# Patient Record
Sex: MALE | Race: WHITE
[De-identification: names, ages, dates, MRNs, and addresses within clinical notes are randomized per-mention and may not be internally consistent; named-entity substitution may affect disease eponyms.]

---

## 2017-01-21 ENCOUNTER — HOSPITAL ENCOUNTER (EMERGENCY)
Dept: HOSPITAL 21 - SED | Age: 47
Discharge: LEFT BEFORE DISCHARGE | End: 2017-01-21
Payer: COMMERCIAL

## 2017-01-21 VITALS
DIASTOLIC BLOOD PRESSURE: 93 MMHG | OXYGEN SATURATION: 98 % | RESPIRATION RATE: 20 BRPM | HEART RATE: 103 BPM | SYSTOLIC BLOOD PRESSURE: 135 MMHG

## 2017-01-21 VITALS — HEIGHT: 77 IN | WEIGHT: 220.46 LBS | BODY MASS INDEX: 26.03 KG/M2

## 2017-01-21 DIAGNOSIS — F11.23: Primary | ICD-10-CM

## 2017-01-21 DIAGNOSIS — F17.200: ICD-10-CM

## 2017-01-21 NOTE — ED.REPORT
HPI-URI / Cough / Cold


Date of Service


Jan 21, 2017


 ED Provider: Sandra Cannon MD


This is a 46 year old male with a history of chronic back, neck pain, IV heroin 

abuse presenting to the ED complaining of productive cough that began 3 weeks 

ago. Describes green and yellow sputum, fever, chills, and malaise. Pt reports 

he is in opiate withdrawal and reports worsening chills and diffuse myalgias. 

Pt states respiratory symptoms are improving but opiate withdrawal is his main 

concern today. Pt expresses interest in suboxone, last used heroin yesterday, 

pt would like to stop. Denies abdominal pain, nausea, vomiting, or shortness of 

breath.


Nursing Notes


Stated Complaint:  COUGH, FEVER


Chief Complaint:  Respiratory Complaints


Nursing Notes Reviewed:  Yes


Allergies:  


Coded Allergies:  


     No Known Allergies (Unverified , 1/24/16)





General


Time Seen by MD:  19:22





Chief Complaint


Cough, non-productive


Hx Obtained From:  Patient


Arrived By:  Walk-in


Onset Occurred:  More than a week ago... (3 weeks)


Symptom Duration:  Since onset


Severity: Current:  Mild


Pertinent Negative:  Pt denies other symptoms


Recent Healthcare:  No recent doctor visit, No recent hospitalization


Similar Sx Previous:  No





Past Medical History


Past Medical History





chronic back and neck pain





Past Surgical History





right ankle surgery





Smoking History


Current Every Day Smoker





Social History


Alcohol Use:  "Social"


Drug Use:  IV drugs, THC


Other Social History:  Good social support, Local resident





Ambulatory Status


Independent





Review of Systems


Constitutional:  Reports: Chills, Fever


Respiratory:  Reports: Prod cough, green, 


   Denies: Shortness of breath


GI:  Denies: Abdominal pain, Nausea, Vomiting


Neurologic:  Denies: Headache


Complete sys rev & neg:  except as marked.





Physical Exam


Initial Vital Signs





 Vital Signs (First)








  Date Time  Temp Pulse Resp B/P Pulse Ox O2 Delivery O2 Flow Rate FiO2


 


1/21/17 19:03 37.1 103 20 135/93 98 Room Air  








Initial VS:  Reviewed


    Head / Eyes:  Atraumatic, Normocephalic, PERRL


    Neck:  Supple, Non-tender, Full range of motion


    Cardiovascular:  Regular rate & rhythm, Heart sounds normal, Intact distal 

pulses


    Abdomen / GI:  Soft, Non-tender, No guarding, No rebound, No distention


    Extremities:  Vascular intact, Neuro intact, No swelling, No tenderness


    Skin:  Warm, Dry, No cyanosis


    Neurologic:  Alert, Oriented, Nonfocal


    Psychiatric:  Mood/affect normal, Behavior normal, Normal thought content


General/Constitutional:  Awake, Alert


ENT:  Airway patent, Mucous membranes moist, Pharynx NL, Tympanic membs NL, Ext 

aud canal NL, Nose exam NL, No sinus tenderness


Respiratory / Chest:  Breath sounds NL, Breath sounds = bilat, No respiratory 

distress, No rales, No rhonchi, No wheezing





Re-Eval/Medical Decision


Med Decision/Clinical Course


46-year-old male with past medical history of heroin abuse here withdrawing.  

He is requesting Suboxone.  I have explained to him that I am not a Suboxone 

provider.  After I explained this to him, he stated "okay, well then I will go 

use heroin."  I offered to have the  come talk to him about detox 

options.  He refused.  He was alert and oriented 4 and of decision-making 

capacity when he walked out of the hospital.


Counseled Regarding:  Diagnosis, Lab results, Need for follow-up, When/why to 

return to ED





Discharge & Departure


Impression:  


 Primary Impression:  


 Opioid dependence with withdrawal


Disposition:  AGAINST MEDICAL ADVICE


Discharge Condition


All VS Reviewed:  Yes


Condition:  Stable


Patient Instructions:  Pain Management and Opioids (ED)





Additional Instructions:


You are always welcome back to the emergency department.  Please seek help with 

detox if you so choose.  Please follow-up with your primary care physician


Referrals:  


[facility] (PCP)


Scribe Attestation


Portions of this note were transcribed by Josue Mclain. I, Dr. Cannon 

personally performed the history, physical exam and medical decision-making; I 

reviewed and confirmed the accuracy of the information in the transcribed note. 





Signed by: chavo Power. 1/21/2017, 20:00.








Sandra Cannon MD Jan 21, 2017 19:24


JOSUE MCLAIN Jan 21, 2017 19:27

## 2017-12-21 ENCOUNTER — HOSPITAL ENCOUNTER (OUTPATIENT)
Dept: HOSPITAL 76 - LAB.N | Age: 47
End: 2017-12-21
Attending: PHYSICIAN ASSISTANT
Payer: MEDICAID

## 2017-12-21 DIAGNOSIS — D64.9: ICD-10-CM

## 2017-12-21 DIAGNOSIS — M79.606: Primary | ICD-10-CM

## 2017-12-21 DIAGNOSIS — Z82.49: ICD-10-CM

## 2017-12-21 DIAGNOSIS — Z81.1: ICD-10-CM

## 2017-12-21 DIAGNOSIS — L03.119: ICD-10-CM

## 2017-12-21 DIAGNOSIS — F17.200: ICD-10-CM

## 2017-12-21 DIAGNOSIS — Z83.3: ICD-10-CM

## 2017-12-21 DIAGNOSIS — G89.29: ICD-10-CM

## 2017-12-21 DIAGNOSIS — F32.9: ICD-10-CM

## 2017-12-21 DIAGNOSIS — Z86.19: ICD-10-CM

## 2017-12-21 DIAGNOSIS — J45.909: ICD-10-CM

## 2017-12-21 DIAGNOSIS — G44.019: ICD-10-CM

## 2017-12-21 LAB
ALBUMIN/GLOB SERPL: 0.9 {RATIO} (ref 1–2.2)
ANION GAP SERPL CALCULATED.4IONS-SCNC: 7 MMOL/L (ref 6–13)
BASOPHILS NFR BLD AUTO: 0.1 10^3/UL (ref 0–0.1)
BASOPHILS NFR BLD AUTO: 0.7 %
BILIRUB BLD-MCNC: < 0.2 MG/DL (ref 0.2–1)
BUN SERPL-MCNC: 13 MG/DL (ref 6–20)
CALCIUM UR-MCNC: 8.7 MG/DL (ref 8.5–10.3)
CHLORIDE SERPL-SCNC: 99 MMOL/L (ref 101–111)
CHOLEST SERPL-MCNC: 119 MG/DL
CO2 SERPL-SCNC: 31 MMOL/L (ref 21–32)
CREAT SERPLBLD-SCNC: 0.8 MG/DL (ref 0.6–1.2)
EOSINOPHIL # BLD AUTO: 0.3 10^3/UL (ref 0–0.7)
EOSINOPHIL NFR BLD AUTO: 3.8 %
ERYTHROCYTE [DISTWIDTH] IN BLOOD BY AUTOMATED COUNT: 15.5 % (ref 12–15)
EST. AVERAGE GLUCOSE BLD GHB EST-MCNC: 126 MG/DL (ref 70–100)
GFRSERPLBLD MDRD-ARVRAT: 103 ML/MIN/{1.73_M2} (ref 89–?)
GLOBULIN SER-MCNC: 4.4 G/DL (ref 2.1–4.2)
GLUCOSE SERPL-MCNC: 96 MG/DL (ref 70–100)
HBA1C BLD-MCNC: 0.49 G/DL
HCT VFR BLD AUTO: 37.2 % (ref 42–52)
HDLC SERPL-MCNC: 43 MG/DL
HDLC SERPL: 2.8 {RATIO} (ref ?–5)
HGB UR QL STRIP: 11.7 G/DL (ref 14–18)
IRON SERPL-MCNC: 20 UG/DL (ref 45–182)
LDLC/HDLC SERPL: 0.7 {RATIO} (ref ?–3.6)
LYMPHOCYTES # SPEC AUTO: 2.9 10^3/UL (ref 1.5–3.5)
LYMPHOCYTES NFR BLD AUTO: 33.6 %
MCH RBC QN AUTO: 24.3 PG (ref 27–31)
MCHC RBC AUTO-ENTMCNC: 31.4 G/DL (ref 32–36)
MCV RBC AUTO: 77.4 FL (ref 80–94)
MONOCYTES # BLD AUTO: 0.6 10^3/UL (ref 0–1)
MONOCYTES NFR BLD AUTO: 7.3 %
NEUTROPHILS # BLD AUTO: 4.7 10^3/UL (ref 1.5–6.6)
NEUTROPHILS # SNV AUTO: 8.7 X10^3/UL (ref 4.8–10.8)
NEUTROPHILS NFR BLD AUTO: 54.6 %
NRBC # BLD AUTO: 0 /100WBC
PDW BLD AUTO: 7.6 FL (ref 7.4–11.4)
POTASSIUM SERPL-SCNC: 4.1 MMOL/L (ref 3.5–5)
PROT SPEC-MCNC: 8.3 G/DL (ref 6.7–8.2)
RBC MAR: 4.8 10^6/UL (ref 4.7–6.1)
SODIUM SERPLBLD-SCNC: 137 MMOL/L (ref 135–145)
TIBC SERPL-MCNC: 423 UG/DL (ref 250–450)
TRANSFERRIN SERPL-MCNC: 302 MG/DL (ref 180–329)
TRIGL P FAST SERPL-MCNC: 230 MG/DL
VLDLC SERPL-SCNC: 46 MG/DL
WBC # BLD: 8.7 X10^3/UL

## 2017-12-21 PROCEDURE — 80053 COMPREHEN METABOLIC PANEL: CPT

## 2017-12-21 PROCEDURE — 85025 COMPLETE CBC W/AUTO DIFF WBC: CPT

## 2017-12-21 PROCEDURE — 83036 HEMOGLOBIN GLYCOSYLATED A1C: CPT

## 2017-12-21 PROCEDURE — 84466 ASSAY OF TRANSFERRIN: CPT

## 2017-12-21 PROCEDURE — 81599 UNLISTED MAAA: CPT

## 2017-12-21 PROCEDURE — 80061 LIPID PANEL: CPT

## 2017-12-21 PROCEDURE — 82728 ASSAY OF FERRITIN: CPT

## 2017-12-21 PROCEDURE — 36415 COLL VENOUS BLD VENIPUNCTURE: CPT

## 2017-12-21 PROCEDURE — 83540 ASSAY OF IRON: CPT

## 2017-12-26 LAB — TEST RESULT: (no result)

## 2020-07-19 ENCOUNTER — HOSPITAL ENCOUNTER (EMERGENCY)
Dept: HOSPITAL 76 - ED | Age: 50
Discharge: HOME | End: 2020-07-19
Payer: OTHER GOVERNMENT

## 2020-07-19 VITALS — SYSTOLIC BLOOD PRESSURE: 162 MMHG | DIASTOLIC BLOOD PRESSURE: 86 MMHG

## 2020-07-19 DIAGNOSIS — I87.8: ICD-10-CM

## 2020-07-19 DIAGNOSIS — L03.115: ICD-10-CM

## 2020-07-19 DIAGNOSIS — L97.319: ICD-10-CM

## 2020-07-19 DIAGNOSIS — F15.980: ICD-10-CM

## 2020-07-19 DIAGNOSIS — F11.90: ICD-10-CM

## 2020-07-19 DIAGNOSIS — F17.200: ICD-10-CM

## 2020-07-19 DIAGNOSIS — L97.329: ICD-10-CM

## 2020-07-19 DIAGNOSIS — L03.116: Primary | ICD-10-CM

## 2020-07-19 PROCEDURE — 99283 EMERGENCY DEPT VISIT LOW MDM: CPT

## 2020-07-19 PROCEDURE — 99282 EMERGENCY DEPT VISIT SF MDM: CPT

## 2020-07-19 NOTE — ED PHYSICIAN DOCUMENTATION
History of Present Illness





- Stated complaint


Stated Complaint: BILAT LEG PX





- Chief complaint


Chief Complaint: General





- History obtained from


History obtained from: Patient





- Additonal information


Additional information: 





49-year-old gentleman with history of IV drug use, both heroin and meth.  Last 

night was the first time he injected meth and now he feels quite anxious.  He is

here complaining of nonhealing sores to both ankles that have been there for the

last 6 months.  No fevers.





Review of Systems


Constitutional: denies: Fever, Chills


Eyes: denies: Loss of vision, Decreased vision


Ears: denies: Loss of hearing, Ear pain


Nose: denies: Rhinorrhea / runny nose, Congestion


Throat: denies: Sore throat





PD PAST MEDICAL HISTORY





- Past Medical History


Cardiovascular: None


Respiratory: None


Endocrine/Autoimmune: None


GI: None


: None


HEENT: None


Psych: Depression


Musculoskeletal: Chronic back pain


Derm: None





- Past Surgical History


Past Surgical History: Yes


Ortho: Other





- Present Medications


Home Medications: 


                                Ambulatory Orders











 Medication  Instructions  Recorded  Confirmed


 


Cephalexin [Keflex] 500 mg PO QID #24 capsule 11/07/17 


 


Naproxen 375 mg PO BID #20 tablet 11/07/17 


 


Oxycodone HCl/Acetaminophen 1 each PO Q6H PRN #25 tablet 11/07/17 





[Percocet 5-325 mg Tablet]   


 


oxyCODONE/ACET 5/325 [Percocet 5 1 tab PRN 11/07/17 





mg/325 mg]   


 


Sulfamethoxazole/Trimethoprim 1 each PO BID 7 Days #20 tablet 07/19/20 





[Sulfamethoxazole-Tmp Ds Tablet]   














- Allergies


Allergies/Adverse Reactions: 


                                    Allergies











Allergy/AdvReac Type Severity Reaction Status Date / Time


 


No Known Drug Allergies Allergy   Verified 07/19/20 20:55














- Social History


Does the pt smoke?: Yes


Smoking Status: Current every day smoker


Does the pt drink ETOH?: No


Does the pt have substance abuse?: Yes





- Immunizations


Immunizations are current?: Yes





- POLST


Patient has POLST: No





PD ED PE NORMAL





- Vitals


Vital signs reviewed: Yes





- General


General: Alert and oriented X 3, No acute distress





- Extremities


Extremities: Other (He has a nonhealing ulcers on both sides of both ankles 

without any drainage to culture.  He has track marks on both arms without 

abscesses.  He has venous stasis changes of both legs with good pedal pulses and

cap refill.)





- Neuro


Neuro: Alert and oriented X 3, Normal speech





Results





- Vitals


Vitals: 





                               Vital Signs - 24 hr











  07/19/20





  20:55


 


Temperature 36.5 C


 


Heart Rate 98


 


Respiratory 18





Rate 


 


Blood Pressure 162/86 H


 


O2 Saturation 99








                                     Oxygen











O2 Source                      Room air

















PD MEDICAL DECISION MAKING





- ED course


ED course: 





49-year-old gentleman with history of IV drug use, nonhealing sores of both 

ankles, venous stasis changes.  Appears like staph but nothing to culture.  Will

try Bactrim.  Feeling very anxious after injecting methamphetamines.  We will 

give him some Ativan.  He plans to present for outpatient detox tomorrow.





Departure





- Departure


Disposition: 01 Home, Self Care


Clinical Impression: 


 IVDU (intravenous drug user)





Cellulitis


Qualifiers:


 Site of cellulitis: extremity Site of cellulitis of extremity: lower extremity 

Laterality: unspecified laterality Qualified Code(s): L03.119 - Cellulitis of 

unspecified part of limb





Condition: Good


Record reviewed to determine appropriate education?: Yes


Instructions:  Cellulitis Dc


Prescriptions: 


Sulfamethoxazole/Trimethoprim [Sulfamethoxazole-Tmp Ds Tablet] 1 each PO BID 7 

Days #20 tablet


Comments: 


I agree with your plans present for outpatient detox tomorrow, potentially 

starting Suboxone.  Do not use drugs tonight, As you will need to be in 

withdrawal to start the process tomorrow..  The Ativan we gave you here should 

help with the anxiety related to the injection of methamphetamines.

## 2020-07-20 ENCOUNTER — HOSPITAL ENCOUNTER (OUTPATIENT)
Dept: HOSPITAL 76 - EMS | Age: 50
Discharge: ANOTHER HOSPITAL | End: 2020-07-20
Attending: SURGERY
Payer: MEDICAID

## 2020-07-20 ENCOUNTER — HOSPITAL ENCOUNTER (EMERGENCY)
Dept: HOSPITAL 76 - ED | Age: 50
Discharge: ANOTHER HOSPITAL | End: 2020-07-20
Payer: MEDICAID

## 2020-07-20 VITALS — DIASTOLIC BLOOD PRESSURE: 82 MMHG | SYSTOLIC BLOOD PRESSURE: 146 MMHG

## 2020-07-20 DIAGNOSIS — R45.1: ICD-10-CM

## 2020-07-20 DIAGNOSIS — R41.82: Primary | ICD-10-CM

## 2020-07-20 DIAGNOSIS — I47.2: ICD-10-CM

## 2020-07-20 DIAGNOSIS — F15.10: ICD-10-CM

## 2020-07-20 DIAGNOSIS — F23: Primary | ICD-10-CM

## 2020-07-20 DIAGNOSIS — R46.89: ICD-10-CM

## 2020-07-20 DIAGNOSIS — J96.91: ICD-10-CM

## 2020-07-20 DIAGNOSIS — F17.200: ICD-10-CM

## 2020-07-20 DIAGNOSIS — F11.90: ICD-10-CM

## 2020-07-20 DIAGNOSIS — Z78.1: ICD-10-CM

## 2020-07-20 LAB
ALBUMIN DIAFP-MCNC: 3.6 G/DL (ref 3.2–5.5)
ALBUMIN DIAFP-MCNC: 3.8 G/DL (ref 3.2–5.5)
ALBUMIN/GLOB SERPL: 0.7 {RATIO} (ref 1–2.2)
ALBUMIN/GLOB SERPL: 0.7 {RATIO} (ref 1–2.2)
ALP SERPL-CCNC: 102 IU/L (ref 42–121)
ALP SERPL-CCNC: 102 IU/L (ref 42–121)
ALT SERPL W P-5'-P-CCNC: 34 IU/L (ref 10–60)
ALT SERPL W P-5'-P-CCNC: 42 IU/L (ref 10–60)
AMPHET UR QL SCN: POSITIVE
ANION GAP SERPL CALCULATED.4IONS-SCNC: 16 MMOL/L (ref 6–13)
ANION GAP SERPL CALCULATED.4IONS-SCNC: 9 MMOL/L (ref 6–13)
APAP SERPL-MCNC: < 10 UG/ML (ref 10–30)
ARTERIAL PATENCY WRIST A: POSITIVE
AST SERPL W P-5'-P-CCNC: 44 IU/L (ref 10–42)
AST SERPL W P-5'-P-CCNC: 71 IU/L (ref 10–42)
BASE EXCESS BLDMV CALC-SCNC: -4 MMOL/L (ref -2–3)
BASOPHILS NFR BLD AUTO: 0 10^3/UL (ref 0–0.1)
BASOPHILS NFR BLD AUTO: 0.1 10^3/UL (ref 0–0.1)
BASOPHILS NFR BLD AUTO: 0.3 %
BASOPHILS NFR BLD AUTO: 0.3 %
BENZODIAZ UR QL SCN: POSITIVE
BILIRUB BLD-MCNC: 0.4 MG/DL (ref 0.2–1)
BILIRUB BLD-MCNC: 0.8 MG/DL (ref 0.2–1)
BUN SERPL-MCNC: 9 MG/DL (ref 6–20)
BUN SERPL-MCNC: 9 MG/DL (ref 6–20)
CALCIUM UR-MCNC: 8.3 MG/DL (ref 8.5–10.3)
CALCIUM UR-MCNC: 8.6 MG/DL (ref 8.5–10.3)
CHLORIDE SERPL-SCNC: 104 MMOL/L (ref 101–111)
CHLORIDE SERPL-SCNC: 98 MMOL/L (ref 101–111)
CK SERPL-CCNC: 589 IU/L (ref 22–269)
CLARITY UR REFRACT.AUTO: CLEAR
CO2 BLDA CALC-SCNC: 22.2 MMOL/L (ref 21–29)
CO2 SERPL-SCNC: 22 MMOL/L (ref 21–32)
CO2 SERPL-SCNC: 24 MMOL/L (ref 21–32)
COCAINE UR-SCNC: NEGATIVE UMOL/L
CREAT SERPLBLD-SCNC: 0.9 MG/DL (ref 0.6–1.2)
CREAT SERPLBLD-SCNC: 0.9 MG/DL (ref 0.6–1.2)
DEPRECATED HCO3 PLAS-SCNC: 21.1 MMOL/L (ref 22–26)
EOSINOPHIL # BLD AUTO: 0 10^3/UL (ref 0–0.7)
EOSINOPHIL # BLD AUTO: 0 10^3/UL (ref 0–0.7)
EOSINOPHIL NFR BLD AUTO: 0 %
EOSINOPHIL NFR BLD AUTO: 0.3 %
ERYTHROCYTE [DISTWIDTH] IN BLOOD BY AUTOMATED COUNT: 15.9 % (ref 12–15)
ERYTHROCYTE [DISTWIDTH] IN BLOOD BY AUTOMATED COUNT: 16.1 % (ref 12–15)
FIO2: 100
GLOBULIN SER-MCNC: 5.1 G/DL (ref 2.1–4.2)
GLOBULIN SER-MCNC: 5.2 G/DL (ref 2.1–4.2)
GLUCOSE SERPL-MCNC: 124 MG/DL (ref 70–100)
GLUCOSE SERPL-MCNC: 127 MG/DL (ref 70–100)
GLUCOSE UR QL STRIP.AUTO: NEGATIVE MG/DL
HGB UR QL STRIP: 10.8 G/DL (ref 14–18)
HGB UR QL STRIP: 11 G/DL (ref 14–18)
KETONES UR QL STRIP.AUTO: NEGATIVE MG/DL
LIPASE SERPL-CCNC: 35 U/L (ref 22–51)
LYMPHOCYTES # SPEC AUTO: 1 10^3/UL (ref 1.5–3.5)
LYMPHOCYTES # SPEC AUTO: 2.8 10^3/UL (ref 1.5–3.5)
LYMPHOCYTES NFR BLD AUTO: 19 %
LYMPHOCYTES NFR BLD AUTO: 6.6 %
MAGNESIUM SERPL-MCNC: 2.1 MG/DL (ref 1.7–2.8)
MCH RBC QN AUTO: 22.1 PG (ref 27–31)
MCH RBC QN AUTO: 22.4 PG (ref 27–31)
MCHC RBC AUTO-ENTMCNC: 29.6 G/DL (ref 32–36)
MCHC RBC AUTO-ENTMCNC: 30.6 G/DL (ref 32–36)
MCV RBC AUTO: 73 FL (ref 80–94)
MCV RBC AUTO: 74.8 FL (ref 80–94)
METHADONE UR QL SCN: NEGATIVE
METHAMPHET UR QL SCN: POSITIVE
MONOCYTES # BLD AUTO: 0.6 10^3/UL (ref 0–1)
MONOCYTES # BLD AUTO: 1 10^3/UL (ref 0–1)
MONOCYTES NFR BLD AUTO: 3.8 %
MONOCYTES NFR BLD AUTO: 6.7 %
NEUTROPHILS # BLD AUTO: 11 10^3/UL (ref 1.5–6.6)
NEUTROPHILS # BLD AUTO: 13.9 10^3/UL (ref 1.5–6.6)
NEUTROPHILS # SNV AUTO: 15 X10^3/UL (ref 4.8–10.8)
NEUTROPHILS # SNV AUTO: 15.6 X10^3/UL (ref 4.8–10.8)
NEUTROPHILS NFR BLD AUTO: 73.2 %
NEUTROPHILS NFR BLD AUTO: 88.7 %
NITRITE UR QL STRIP.AUTO: NEGATIVE
OPIATES UR QL SCN: POSITIVE
PCO2 TEMP ADJ BLDCOA: 39 MMHG (ref 34–45)
PDW BLD AUTO: 8.3 FL (ref 7.4–11.4)
PDW BLD AUTO: 8.4 FL (ref 7.4–11.4)
PH TEMP ADJ BLDA: 7.36 [PH] (ref 7.35–7.45)
PH UR STRIP.AUTO: 8 PH (ref 5–7.5)
PHOSPHATE BLD-MCNC: 4.6 MG/DL (ref 2.5–4.6)
PLATELET # BLD: 465 10^3/UL (ref 130–450)
PLATELET # BLD: 492 10^3/UL (ref 130–450)
PO2 TEMP ADJ BLDCOA: 114 MMHG (ref 80–100)
PROT SPEC-MCNC: 8.8 G/DL (ref 6.7–8.2)
PROT SPEC-MCNC: 8.9 G/DL (ref 6.7–8.2)
PROT UR STRIP.AUTO-MCNC: NEGATIVE MG/DL
RBC # UR STRIP.AUTO: (no result) /UL
RBC MAR: 4.88 10^6/UL (ref 4.7–6.1)
RBC MAR: 4.92 10^6/UL (ref 4.7–6.1)
SALICYLATES SERPL-MCNC: < 6 MG/DL
SAO2 % BLDA FROM PO2: 98 % (ref 94–98)
SODIUM SERPLBLD-SCNC: 136 MMOL/L (ref 135–145)
SODIUM SERPLBLD-SCNC: 137 MMOL/L (ref 135–145)
SP GR UR STRIP.AUTO: 1.01 (ref 1–1.03)
UROBILINOGEN UR QL STRIP.AUTO: (no result) E.U./DL
UROBILINOGEN UR STRIP.AUTO-MCNC: NEGATIVE MG/DL
VOLATILE DRUGS POS SERPL SCN: (no result)

## 2020-07-20 PROCEDURE — 82550 ASSAY OF CK (CPK): CPT

## 2020-07-20 PROCEDURE — 51701 INSERT BLADDER CATHETER: CPT

## 2020-07-20 PROCEDURE — 84443 ASSAY THYROID STIM HORMONE: CPT

## 2020-07-20 PROCEDURE — 81001 URINALYSIS AUTO W/SCOPE: CPT

## 2020-07-20 PROCEDURE — 70450 CT HEAD/BRAIN W/O DYE: CPT

## 2020-07-20 PROCEDURE — 96372 THER/PROPH/DIAG INJ SC/IM: CPT

## 2020-07-20 PROCEDURE — 83735 ASSAY OF MAGNESIUM: CPT

## 2020-07-20 PROCEDURE — 96375 TX/PRO/DX INJ NEW DRUG ADDON: CPT

## 2020-07-20 PROCEDURE — 96361 HYDRATE IV INFUSION ADD-ON: CPT

## 2020-07-20 PROCEDURE — 31500 INSERT EMERGENCY AIRWAY: CPT

## 2020-07-20 PROCEDURE — 82803 BLOOD GASES ANY COMBINATION: CPT

## 2020-07-20 PROCEDURE — 84100 ASSAY OF PHOSPHORUS: CPT

## 2020-07-20 PROCEDURE — 80320 DRUG SCREEN QUANTALCOHOLS: CPT

## 2020-07-20 PROCEDURE — 36415 COLL VENOUS BLD VENIPUNCTURE: CPT

## 2020-07-20 PROCEDURE — 80329 ANALGESICS NON-OPIOID 1 OR 2: CPT

## 2020-07-20 PROCEDURE — 85025 COMPLETE CBC W/AUTO DIFF WBC: CPT

## 2020-07-20 PROCEDURE — 96365 THER/PROPH/DIAG IV INF INIT: CPT

## 2020-07-20 PROCEDURE — 81003 URINALYSIS AUTO W/O SCOPE: CPT

## 2020-07-20 PROCEDURE — 94770: CPT

## 2020-07-20 PROCEDURE — 80053 COMPREHEN METABOLIC PANEL: CPT

## 2020-07-20 PROCEDURE — 80306 DRUG TEST PRSMV INSTRMNT: CPT

## 2020-07-20 PROCEDURE — 87086 URINE CULTURE/COLONY COUNT: CPT

## 2020-07-20 PROCEDURE — 93005 ELECTROCARDIOGRAM TRACING: CPT

## 2020-07-20 PROCEDURE — 83690 ASSAY OF LIPASE: CPT

## 2020-07-20 PROCEDURE — 36600 WITHDRAWAL OF ARTERIAL BLOOD: CPT

## 2020-07-20 PROCEDURE — 99291 CRITICAL CARE FIRST HOUR: CPT

## 2020-07-20 PROCEDURE — 36556 INSERT NON-TUNNEL CV CATH: CPT

## 2020-07-20 PROCEDURE — 71045 X-RAY EXAM CHEST 1 VIEW: CPT

## 2020-07-20 PROCEDURE — 96376 TX/PRO/DX INJ SAME DRUG ADON: CPT

## 2020-07-20 PROCEDURE — 80307 DRUG TEST PRSMV CHEM ANLYZR: CPT

## 2020-07-20 NOTE — XRAY REPORT
PROCEDURE:  Chest for Line Placement

 

INDICATIONS:  INTUBATION AND RIGHT CENTRAL LINE

 

TECHNIQUE:  One view of the chest was acquired.  

 

COMPARISON:  None

 

FINDINGS:  

 

Surgical changes and devices: The tip of the endotracheal tube is 4.5 above the duy.  There is a r
ight IJ central line with the tip projecting to the atriocaval junction

 

Lungs and pleura:  Bilateral perihilar infiltrates and bibasilar atelectasis. No pleural effusions or
 pneumothorax.  

 

Mediastinum:  Mediastinal contours appear normal.  Heart size is normal.  

 

Bones and chest wall:  No suspicious bony lesions.  Overlying soft tissues appear unremarkable.  

 

IMPRESSION:  

 

1. The tip of the endotracheal tube is 4.5 above the duy.

2. The tip of the right IJ central line projects to the atrial caval junction.

3. Lateral perihilar infiltrate consistent with pulmonary edema.

4. Bibasilar atelectasis.

 

Reviewed by: Mikel Amezcua MD on 7/20/2020 11:42 AM PDT

Approved by: Mikel Amezcua MD on 7/20/2020 11:42 AM PDT

 

 

Station ID:  [id number]

## 2020-07-20 NOTE — CT REPORT
PROCEDURE:  HEAD WO

 

INDICATIONS:  aloc

 

TECHNIQUE:  

Noncontrast 4.5 mm thick angled axial sections acquired from the foramen magnum to the vertex.  For r
adiation dose reduction, the following was used:  automated exposure control, adjustment of mA and/or
 kV according to patient size.

 

COMPARISON:  None.

 

FINDINGS:  

Image quality:  Excellent.  

 

CSF spaces:  Basal cisterns are patent.  No extra-axial fluid collections.  Ventricles are normal in 
size and shape.  

 

Brain:  No midline shift.  No intracranial masses or hemorrhage.  Gray-white matter interface is norm
al.  

 

Skull and face:  Calvarium and visualized facial bones are intact, without suspicious lesions.  

 

Sinuses:  Visualized sinuses and mastoids are clear.  

 

IMPRESSION:  No acute intracranial abnormality.

 

Reviewed by: Mikel Amezcua MD on 7/20/2020 12:03 PM PDT

Approved by: Mikel Amezcua MD on 7/20/2020 12:03 PM PDT

 

 

Station ID:  [id number]

## 2020-07-20 NOTE — ED PHYSICIAN DOCUMENTATION
ED Addendum





- Addendum


Addendum: 





07/20/20 08:17


I took over care from Dr. Tineo.  The patient is restless and agitated upon my

evaluation.  He is having recurrent apneic episodes, hypoxic down to the 50s and

60s.  Unclear if the IV is working or not.  A oral pharyngeal airway was placed 

while we located a nasopharyngeal airway.  This was then placed and the patient 

was placed on oxygen.  No further hypoxia.  He is still very agitated, therefore

ketamine 500 mg IM was given.  There may be an ICU bed available here later this

morning.  Currently the ICU beds are full, they may have a nurse available soon 

however.  A new IV was placed.





PD MEDICAL DECISION MAKING





- ED course


Complexity details: reviewed results, re-evaluated patient, considered 

differential, d/w consultant


ED course: 





49-year-old male with altered mental status, presumed secondary to illicit drug 

use.  He also apparently had been inhaling bug spray.  Unclear what brand.  He 

was very agitated and altered when I took over his care.  He then had several 

apneic episodes along with significant hypoxemia.  O2 sat down into the 50s and 

60s.  Oral pharyngeal airways and nasopharyngeal airway were placed.  He was 

placed on oxygen.  He then had 2 episodes of ventricular tachycardia, 

approximately 10-15 beats per episode.  He continued to have significant 

agitation and apnea, therefore the decision was made to intubate him for airway 

protection and respiratory failure.  Patient was then intubated.  He was a 

difficult IV access, therefore a central line was placed while he was paralyzed 

from the intubation.  Patient is maintained on a propofol drip.  Patient will be

transferred to [place] in [place] as there are no ICU beds available here.  

Discussed the case with Dr. Parr, who graciously accepts in transfer.  COBRA 

forms completed.  Discussed the case at approximately 1210.





This document was made in part using voice recognition software. While efforts 

are made to proofread this document, sound alike and grammatical errors may 

occur.





- Critical Care


Time(min): 65


Time Includes: Direct patient care, Reassess patient, Document care, Coordinate 

care, See progress note


Data interpretation: See progress note


Procedures included in critical care time: See progress note


Procedures excluded from critical care time: Central IV, Intubation, See 

progress note





Procedures





- Intubation


Provider: Emergency physician


Medications: Versed, Propofol, Rocuronium


Blade: Glidescope


Tube: Size-enter number (8), Cuffed, Marked at teeth-enter cm (25)


Route: Oral


Confirmation: Direct visualization, Bilateral breath sounds, End tidal CO2, 

Pulse ox, Chest xray


Complications: No compications





- Central Line


Central Line Preparation: Unable to obtain consent, Time out completed, 

Ultrasound used, Sterile prep and drape


Central line location: Right IJ


Central line type: Triple lumen


Central line aftercare: Chlorhexidine disc placed, Secured, Placement confirmed,

No pneumothorax, No complications, Bundle checklist complete, Pt tolerated well





Departure





- Departure


Disposition: 02 Transfer Acute Care Hosp


Clinical Impression: 


 Acute psychosis, Psychomotor agitation, Methamphetamine abuse, V-tach, IVDU 

(intravenous drug user)





Respiratory failure


Qualifiers:


 Chronicity: unspecified Respiratory failure complication: unspecified whether 

with hypoxia or hypercapnia Qualified Code(s): J96.90 - Respiratory failure, 

unspecified, unspecified whether with hypoxia or hypercapnia





Condition: Stable





Results





- Vitals


Vitals: 


                               Vital Signs - 24 hr











  07/20/20 07/20/20 07/20/20





  01:55 02:08 02:20


 


Temperature 36.3 C L  


 


Heart Rate 125 H 107 H 116 H


 


Respiratory 24 20 35 H





Rate   


 


Blood Pressure  158/128 H 171/124 H


 


O2 Saturation 99 97 99














  07/20/20 07/20/20 07/20/20





  02:39 02:59 05:42


 


Temperature   


 


Heart Rate 110 H 116 H 120 H


 


Respiratory 32 H 25 H 32 H





Rate   


 


Blood Pressure 166/88 H 166/88 H 149/130 H


 


O2 Saturation 92 98 90 L














  07/20/20 07/20/20 07/20/20





  06:00 06:30 06:59


 


Temperature   


 


Heart Rate 112 H 120 H 124 H


 


Respiratory 17 22 25 H





Rate   


 


Blood Pressure   


 


O2 Saturation 95 98 100














  07/20/20 07/20/20 07/20/20





  07:01 07:30 07:55


 


Temperature 37.6 C H  


 


Heart Rate  119 H 121 H


 


Respiratory  26 H 24





Rate   


 


Blood Pressure  135/112 H 148/130 H


 


O2 Saturation  87 L 100














  07/20/20 07/20/20 07/20/20





  08:00 08:19 08:30


 


Temperature   


 


Heart Rate 125 H 118 H 115 H


 


Respiratory 17 15 31 H





Rate   


 


Blood Pressure 145/111 H 161/103 H 161/103 H


 


O2 Saturation 100 100 100














  07/20/20 07/20/20 07/20/20





  09:18 09:30 10:00


 


Temperature   


 


Heart Rate 113 H 115 H 121 H


 


Respiratory 39 H 32 H 32 H





Rate   


 


Blood Pressure 151/122 H 173/142 H 161/88 H


 


O2 Saturation 100 99 100








                                     Oxygen











O2 Source                      Simple Mask

















- EKG (time done)


  ** 1155


Rate: Rate (enter#) (109)


Rhythm: Sinus tachycardia


Axis: Normal


Intervals: Normal MA


QRS: Normal


Ischemia: Normal ST segments





- Labs


Labs: 


                                Laboratory Tests











  07/20/20 07/20/20 07/20/20





  02:24 02:25 02:25


 


WBC   15.0 H 


 


RBC   4.88 


 


Hgb   10.8 L 


 


Hct   36.5 L 


 


MCV   74.8 L 


 


MCH   22.1 L 


 


MCHC   29.6 L 


 


RDW   15.9 H 


 


Plt Count   492 H 


 


MPV   8.4 


 


Neut # (Auto)   11.0 H 


 


Lymph # (Auto)   2.8 


 


Mono # (Auto)   1.0 


 


Eos # (Auto)   0.0 


 


Baso # (Auto)   0.1 


 


Absolute Nucleated RBC   0.00 


 


Nucleated RBC %   0.0 


 


Sodium    136


 


Potassium    3.4 L


 


Chloride    98 L


 


Carbon Dioxide    22


 


Anion Gap    16.0 H


 


BUN    9


 


Creatinine    0.9


 


Estimated GFR (MDRD)    89


 


Glucose    127 H


 


Calcium    8.6


 


Total Bilirubin    0.8


 


AST    44 H


 


ALT    34


 


Alkaline Phosphatase    102


 


Total Creatine Kinase    589 H


 


Total Protein    8.9 H


 


Albumin    3.8


 


Globulin    5.1 H


 


Albumin/Globulin Ratio    0.7 L


 


Lipase    35


 


TSH   


 


Urine Color  YELLOW  


 


Urine Clarity  CLEAR  


 


Urine pH  8.0 H  


 


Ur Specific Gravity  1.015  


 


Urine Protein  NEGATIVE  


 


Urine Glucose (UA)  NEGATIVE  


 


Urine Ketones  NEGATIVE  


 


Urine Occult Blood  TRACE-INTA  


 


Urine Nitrite  NEGATIVE  


 


Urine Bilirubin  NEGATIVE  


 


Urine Urobilinogen  0.2 (NORMAL)  


 


Ur Leukocyte Esterase  NEGATIVE  


 


Ur Microscopic Review  NOT INDICATED  


 


Urine Culture Comments  NOT INDICATED  


 


Salicylates    < 6.0


 


Urine Opiates Screen  POSITIVE H  


 


Ur Oxycodone Screen  NEGATIVE  


 


Urine Methadone Screen  NEGATIVE  


 


Ur Propoxyphene Screen  NEGATIVE  


 


Acetaminophen    < 10 L


 


Ur Barbiturates Screen  NEGATIVE  


 


Ur Tricyclics Screen  NEGATIVE  


 


Ur Phencyclidine Scrn  NEGATIVE  


 


Ur Amphetamine Screen  POSITIVE H  


 


U Methamphetamines Scrn  POSITIVE H  


 


U Benzodiazepines Scrn  POSITIVE H  


 


Urine Cocaine Screen  NEGATIVE  


 


U Cannabinoids Screen  NEGATIVE  


 


Ethyl Alcohol    < 5.0














  07/20/20





  02:25


 


WBC 


 


RBC 


 


Hgb 


 


Hct 


 


MCV 


 


MCH 


 


MCHC 


 


RDW 


 


Plt Count 


 


MPV 


 


Neut # (Auto) 


 


Lymph # (Auto) 


 


Mono # (Auto) 


 


Eos # (Auto) 


 


Baso # (Auto) 


 


Absolute Nucleated RBC 


 


Nucleated RBC % 


 


Sodium 


 


Potassium 


 


Chloride 


 


Carbon Dioxide 


 


Anion Gap 


 


BUN 


 


Creatinine 


 


Estimated GFR (MDRD) 


 


Glucose 


 


Calcium 


 


Total Bilirubin 


 


AST 


 


ALT 


 


Alkaline Phosphatase 


 


Total Creatine Kinase 


 


Total Protein 


 


Albumin 


 


Globulin 


 


Albumin/Globulin Ratio 


 


Lipase 


 


TSH  0.30 L


 


Urine Color 


 


Urine Clarity 


 


Urine pH 


 


Ur Specific Gravity 


 


Urine Protein 


 


Urine Glucose (UA) 


 


Urine Ketones 


 


Urine Occult Blood 


 


Urine Nitrite 


 


Urine Bilirubin 


 


Urine Urobilinogen 


 


Ur Leukocyte Esterase 


 


Ur Microscopic Review 


 


Urine Culture Comments 


 


Salicylates 


 


Urine Opiates Screen 


 


Ur Oxycodone Screen 


 


Urine Methadone Screen 


 


Ur Propoxyphene Screen 


 


Acetaminophen 


 


Ur Barbiturates Screen 


 


Ur Tricyclics Screen 


 


Ur Phencyclidine Scrn 


 


Ur Amphetamine Screen 


 


U Methamphetamines Scrn 


 


U Benzodiazepines Scrn 


 


Urine Cocaine Screen 


 


U Cannabinoids Screen 


 


Ethyl Alcohol 














- Rads (name of study)


  ** head CT


Radiology: Prelim report reviewed, EMP read contemporaneously, See rad report 

(No acute intracranial abnormality)





  ** cxr


Radiology: Prelim report reviewed, EMP read contemporaneously, See rad report 

(1. The tip of the endotracheal tube is 4.5 above the duy. 2. The tip of the 

right IJ central line projects to the atrial caval junction. 3. Lateral 

perihilar infiltrate consistent with pulmonary edema. 4. Bibasilar atelectasis. 

)

## 2020-07-20 NOTE — ED PHYSICIAN DOCUMENTATION
PD HPI MHE





- Stated complaint


Stated Complaint: MHE





- History obtained from


History obtained from: Patient, EMS (I am unsure who called EMS for the 

patient), Police





- History of Present Illness


Primary symptom: Psychosis, Anxiety (very agitated behavior and not able to 

focus on following commands.)


Timing - onset: Today (the past few hours. Had seemed okay earlier in the day 

when in ER for leg skin infection. Reportedly from ER report earlier in the day,

the patient had done IV meth for the first time. Does have prior meth/cannibis 

use inhalational, as well as "huffing" history.)


Contributing factors: Substance abuse - drugs (meth earlier in the day and EMS 

reports possible hydrocarbon (huffing) this evening.)


Similar symptoms before: Has not had sx before


Recently seen: Emergency Dept (seen for redness/infection of lower leg and Rx 

antibiotic. At that visit, the ER report said the patient admitted to meth use 

earlier in the day, but he seemed okay acting at time of discharge.)





Review of Systems


Unable to obtain: AMS, Uncooperative





PD PAST MEDICAL HISTORY





- Past Medical History


Cardiovascular: None


Respiratory: None


Neuro: None


Endocrine/Autoimmune: None


GI: None


: None


HEENT: None


Psych: Depression


Musculoskeletal: Chronic back pain


Derm: None, Other (recent Dx of skin infection)





- Past Surgical History


Past Surgical History: Yes


Ortho: Other





- Allergies


Allergies/Adverse Reactions: 


                                    Allergies











Allergy/AdvReac Type Severity Reaction Status Date / Time


 


No Known Drug Allergies Allergy   Verified 07/19/20 20:55














- Social History


Does the pt smoke?: Yes


Smoking Status: Current every day smoker


Does the pt drink ETOH?: No


Does the pt have substance abuse?: Yes


Substance Use and Type: Marijuana, Meth, Other (hydrocarbons)





- Immunizations


Immunizations are current?: Yes





- POLST


Patient has POLST: No





PD ED PE NORMAL





- General


General: Other (He is very agitated and screaming and unfocused.  He is 

thrashing without purposeful intent to hurt anyone but just kicking out and 

tossing arms randomly.  He arrives in restraints).  No: Alert and oriented X 3





- HEENT


HEENT: Atraumatic, Pharynx benign (Poorly visible due to lack of cooperation but

no obvious abnormality).  No: Moist mucous membranes





- Neck


Neck: Supple, no meningeal sign, No adenopathy, Other (Spontaneous range of 

motion of the neck without any apparent pain)





- Cardiac


Cardiac: No: RRR (Tachycardic but regular)





- Respiratory


Respiratory: Clear bilaterally, Other (Clear loud screaming lungs voice and 

aeration)





- Abdomen


Abdomen: Soft, Non tender





- Derm


Derm: Warm and dry, Other (some redness of lower leg).  No: Normal color (some 

pallor)





- Extremities


Extremities: No deformity, Normal ROM s pain





- Neuro


Neuro: No motor deficit


Eye Opening: Spontaneous


Motor: Localizes to Pain


Verbal: Inappropriate


GCS Score: 12





Results





- Vitals


Vitals: 


                               Vital Signs - 24 hr











  07/20/20 07/20/20 07/20/20





  01:55 02:08 02:20


 


Temperature 36.3 C L  


 


Heart Rate 125 H 107 H 116 H


 


Respiratory 24 20 35 H





Rate   


 


Blood Pressure  158/128 H 171/124 H


 


O2 Saturation 99 97 99














  07/20/20 07/20/20 07/20/20





  02:39 02:59 05:42


 


Temperature   


 


Heart Rate 110 H 116 H 120 H


 


Respiratory 32 H 25 H 32 H





Rate   


 


Blood Pressure 166/88 H 166/88 H 149/130 H


 


O2 Saturation 92 98 90 L














  07/20/20 07/20/20 07/20/20





  06:00 06:30 06:59


 


Temperature   


 


Heart Rate 112 H 120 H 124 H


 


Respiratory 17 22 25 H





Rate   


 


Blood Pressure   


 


O2 Saturation 95 98 100














  07/20/20





  07:01


 


Temperature 37.6 C H


 


Heart Rate 


 


Respiratory 





Rate 


 


Blood Pressure 


 


O2 Saturation 








                                     Oxygen











O2 Source                      Room air

















- Labs


Labs: 


                                Laboratory Tests











  07/20/20 07/20/20 07/20/20





  02:24 02:25 02:25


 


WBC   15.0 H 


 


RBC   4.88 


 


Hgb   10.8 L 


 


Hct   36.5 L 


 


MCV   74.8 L 


 


MCH   22.1 L 


 


MCHC   29.6 L 


 


RDW   15.9 H 


 


Plt Count   492 H 


 


MPV   8.4 


 


Neut # (Auto)   11.0 H 


 


Lymph # (Auto)   2.8 


 


Mono # (Auto)   1.0 


 


Eos # (Auto)   0.0 


 


Baso # (Auto)   0.1 


 


Absolute Nucleated RBC   0.00 


 


Nucleated RBC %   0.0 


 


Sodium    136


 


Potassium    3.4 L


 


Chloride    98 L


 


Carbon Dioxide    22


 


Anion Gap    16.0 H


 


BUN    9


 


Creatinine    0.9


 


Estimated GFR (MDRD)    89


 


Glucose    127 H


 


Calcium    8.6


 


Total Bilirubin    0.8


 


AST    44 H


 


ALT    34


 


Alkaline Phosphatase    102


 


Total Creatine Kinase    589 H


 


Total Protein    8.9 H


 


Albumin    3.8


 


Globulin    5.1 H


 


Albumin/Globulin Ratio    0.7 L


 


Lipase    35


 


TSH   


 


Urine Color  YELLOW  


 


Urine Clarity  CLEAR  


 


Urine pH  8.0 H  


 


Ur Specific Gravity  1.015  


 


Urine Protein  NEGATIVE  


 


Urine Glucose (UA)  NEGATIVE  


 


Urine Ketones  NEGATIVE  


 


Urine Occult Blood  TRACE-INTA  


 


Urine Nitrite  NEGATIVE  


 


Urine Bilirubin  NEGATIVE  


 


Urine Urobilinogen  0.2 (NORMAL)  


 


Ur Leukocyte Esterase  NEGATIVE  


 


Ur Microscopic Review  NOT INDICATED  


 


Urine Culture Comments  NOT INDICATED  


 


Salicylates    < 6.0


 


Urine Opiates Screen  POSITIVE H  


 


Ur Oxycodone Screen  NEGATIVE  


 


Urine Methadone Screen  NEGATIVE  


 


Ur Propoxyphene Screen  NEGATIVE  


 


Acetaminophen    < 10 L


 


Ur Barbiturates Screen  NEGATIVE  


 


Ur Tricyclics Screen  NEGATIVE  


 


Ur Phencyclidine Scrn  NEGATIVE  


 


Ur Amphetamine Screen  POSITIVE H  


 


U Methamphetamines Scrn  POSITIVE H  


 


U Benzodiazepines Scrn  POSITIVE H  


 


Urine Cocaine Screen  NEGATIVE  


 


U Cannabinoids Screen  NEGATIVE  


 


Ethyl Alcohol    < 5.0














  07/20/20





  02:25


 


WBC 


 


RBC 


 


Hgb 


 


Hct 


 


MCV 


 


MCH 


 


MCHC 


 


RDW 


 


Plt Count 


 


MPV 


 


Neut # (Auto) 


 


Lymph # (Auto) 


 


Mono # (Auto) 


 


Eos # (Auto) 


 


Baso # (Auto) 


 


Absolute Nucleated RBC 


 


Nucleated RBC % 


 


Sodium 


 


Potassium 


 


Chloride 


 


Carbon Dioxide 


 


Anion Gap 


 


BUN 


 


Creatinine 


 


Estimated GFR (MDRD) 


 


Glucose 


 


Calcium 


 


Total Bilirubin 


 


AST 


 


ALT 


 


Alkaline Phosphatase 


 


Total Creatine Kinase 


 


Total Protein 


 


Albumin 


 


Globulin 


 


Albumin/Globulin Ratio 


 


Lipase 


 


TSH  0.30 L


 


Urine Color 


 


Urine Clarity 


 


Urine pH 


 


Ur Specific Gravity 


 


Urine Protein 


 


Urine Glucose (UA) 


 


Urine Ketones 


 


Urine Occult Blood 


 


Urine Nitrite 


 


Urine Bilirubin 


 


Urine Urobilinogen 


 


Ur Leukocyte Esterase 


 


Ur Microscopic Review 


 


Urine Culture Comments 


 


Salicylates 


 


Urine Opiates Screen 


 


Ur Oxycodone Screen 


 


Urine Methadone Screen 


 


Ur Propoxyphene Screen 


 


Acetaminophen 


 


Ur Barbiturates Screen 


 


Ur Tricyclics Screen 


 


Ur Phencyclidine Scrn 


 


Ur Amphetamine Screen 


 


U Methamphetamines Scrn 


 


U Benzodiazepines Scrn 


 


Urine Cocaine Screen 


 


U Cannabinoids Screen 


 


Ethyl Alcohol 














PD MEDICAL DECISION MAKING





- ED course


Complexity details: considered differential (He is afebrile.  He appears to be 

psychotic and presumably meth related or other substance abuse.  He does not 

have any history of psychiatric disorder that I can see on our records.  We will

try to give him some hydration.  Will check labs and CK.  At this point he needs

restraining for safety ), d/w patient


ED course: 





He is given initially ketamine to provide a an initial level of sedation.  This 

was enough to allow for obtaining an IV and blood draw.  He was still having 

psychomotor agitation to some degree however.  He was given subsequently some 

Haldol and Ativan IV to have an effect assuming the ketamine will be wearing 

off.  He did require repeated doses of Haldol and Ativan and then some Zyprexa 

in order to provide a decrease in his agitation.  He was still yelling out 

periodically and having some movements within the cart.  His airway and vitals 

remained good throughout this and was observed very closely by staff.





Departure





- Departure


Clinical Impression: 


 Acute psychosis, Psychomotor agitation, Methamphetamine abuse





Condition: Stable


Record reviewed to determine appropriate education?: Yes

## 2020-10-09 ENCOUNTER — HOSPITAL ENCOUNTER (OUTPATIENT)
Dept: HOSPITAL 76 - LAB.WCP | Age: 50
Discharge: HOME | End: 2020-10-09
Attending: FAMILY MEDICINE
Payer: MEDICAID

## 2020-10-09 DIAGNOSIS — D64.9: Primary | ICD-10-CM

## 2020-10-09 DIAGNOSIS — I10: ICD-10-CM

## 2020-10-09 DIAGNOSIS — I73.9: ICD-10-CM

## 2020-10-09 DIAGNOSIS — I83.009: ICD-10-CM

## 2020-10-09 LAB
ALBUMIN DIAFP-MCNC: 3.5 G/DL (ref 3.2–5.5)
ALBUMIN/GLOB SERPL: 0.8 {RATIO} (ref 1–2.2)
ALP SERPL-CCNC: 100 IU/L (ref 42–121)
ALT SERPL W P-5'-P-CCNC: 28 IU/L (ref 10–60)
ANION GAP SERPL CALCULATED.4IONS-SCNC: 9 MMOL/L (ref 6–13)
AST SERPL W P-5'-P-CCNC: 32 IU/L (ref 10–42)
BASOPHILS NFR BLD AUTO: 0.1 10^3/UL (ref 0–0.1)
BASOPHILS NFR BLD AUTO: 0.5 %
BILIRUB BLD-MCNC: 0.4 MG/DL (ref 0.2–1)
BUN SERPL-MCNC: 13 MG/DL (ref 6–20)
CALCIUM UR-MCNC: 8.3 MG/DL (ref 8.5–10.3)
CHLORIDE SERPL-SCNC: 100 MMOL/L (ref 101–111)
CHOLEST SERPL-MCNC: 106 MG/DL
CO2 SERPL-SCNC: 28 MMOL/L (ref 21–32)
CREAT SERPLBLD-SCNC: 0.9 MG/DL (ref 0.6–1.2)
EOSINOPHIL # BLD AUTO: 0.3 10^3/UL (ref 0–0.7)
EOSINOPHIL NFR BLD AUTO: 3.3 %
ERYTHROCYTE [DISTWIDTH] IN BLOOD BY AUTOMATED COUNT: 17.4 % (ref 12–15)
GLOBULIN SER-MCNC: 4.5 G/DL (ref 2.1–4.2)
GLUCOSE SERPL-MCNC: 112 MG/DL (ref 70–100)
HDLC SERPL-MCNC: 47 MG/DL
HDLC SERPL: 2.3 {RATIO} (ref ?–5)
HGB UR QL STRIP: 9.9 G/DL (ref 14–18)
LDLC SERPL CALC-MCNC: 35 MG/DL
LDLC/HDLC SERPL: 0.7 {RATIO} (ref ?–3.6)
LYMPHOCYTES # SPEC AUTO: 2.2 10^3/UL (ref 1.5–3.5)
LYMPHOCYTES NFR BLD AUTO: 21.5 %
MCH RBC QN AUTO: 22.1 PG (ref 27–31)
MCHC RBC AUTO-ENTMCNC: 28.9 G/DL (ref 32–36)
MCV RBC AUTO: 76.3 FL (ref 80–94)
MONOCYTES # BLD AUTO: 0.8 10^3/UL (ref 0–1)
MONOCYTES NFR BLD AUTO: 7.9 %
NEUTROPHILS # BLD AUTO: 6.9 10^3/UL (ref 1.5–6.6)
NEUTROPHILS # SNV AUTO: 10.4 X10^3/UL (ref 4.8–10.8)
NEUTROPHILS NFR BLD AUTO: 66.5 %
PDW BLD AUTO: 9.1 FL (ref 7.4–11.4)
PLATELET # BLD: 422 10^3/UL (ref 130–450)
PROT SPEC-MCNC: 8 G/DL (ref 6.7–8.2)
RBC MAR: 4.48 10^6/UL (ref 4.7–6.1)
SODIUM SERPLBLD-SCNC: 137 MMOL/L (ref 135–145)
VLDLC SERPL-SCNC: 24 MG/DL

## 2020-10-09 PROCEDURE — 80061 LIPID PANEL: CPT

## 2020-10-09 PROCEDURE — 83721 ASSAY OF BLOOD LIPOPROTEIN: CPT

## 2020-10-09 PROCEDURE — 80053 COMPREHEN METABOLIC PANEL: CPT

## 2020-10-09 PROCEDURE — 85025 COMPLETE CBC W/AUTO DIFF WBC: CPT

## 2020-10-09 PROCEDURE — 84443 ASSAY THYROID STIM HORMONE: CPT

## 2020-10-09 PROCEDURE — 36415 COLL VENOUS BLD VENIPUNCTURE: CPT

## 2023-05-04 ENCOUNTER — HOSPITAL ENCOUNTER (EMERGENCY)
Dept: HOSPITAL 76 - ED | Age: 53
LOS: 1 days | Discharge: HOME | End: 2023-05-05
Payer: MEDICAID

## 2023-05-04 VITALS — SYSTOLIC BLOOD PRESSURE: 126 MMHG | DIASTOLIC BLOOD PRESSURE: 91 MMHG

## 2023-05-04 DIAGNOSIS — L03.115: Primary | ICD-10-CM

## 2023-05-04 DIAGNOSIS — F17.200: ICD-10-CM

## 2023-05-04 PROCEDURE — 99283 EMERGENCY DEPT VISIT LOW MDM: CPT

## 2023-05-04 PROCEDURE — 99284 EMERGENCY DEPT VISIT MOD MDM: CPT

## 2023-05-04 PROCEDURE — 93971 EXTREMITY STUDY: CPT

## 2023-05-04 NOTE — ED PHYSICIAN DOCUMENTATION
History of Present Illness





- Stated complaint


Stated Complaint: RT LEG PX





- Chief complaint


Chief Complaint: Ext Problem





- History obtained from


History obtained from: Patient





- Additonal information


Additional information: 





HPI from patient.


Patient notes chronic BLE swelling and discoloration, but chief complaint 

tonight is 2-3 days of rapid increase in swelling of RLE associated with new 

redness and painful discomfort. Patient says symptoms are similar to previous 

leg cellulitis. He denies h/o blood clots in leg(s). Pain is worse with 

movement, weight-bearing, palpation. Denies fevers





Review of Systems


Skin: reports: Rash


Musculoskeletal: reports: Extremity pain, Extremity swelling


Neurologic: denies: Focal weakness, Numbness





PD PAST MEDICAL HISTORY





- Past Medical History


Cardiovascular: None


Respiratory: None


Neuro: None


Endocrine/Autoimmune: None


GI: None


: None


HEENT: None


Psych: Depression


Musculoskeletal: Chronic back pain


Derm: None, Other (recent Dx of skin infection)





- Past Surgical History


Past Surgical History: Yes


Ortho: Other





- Present Medications


Home Medications: 


                                Ambulatory Orders











 Medication  Instructions  Recorded  Confirmed


 


Doxycycline [Vibramycin] 100 mg PO BID #19 tablet 05/05/23 














- Allergies


Allergies/Adverse Reactions: 


                                    Allergies











Allergy/AdvReac Type Severity Reaction Status Date / Time


 


No Known Drug Allergies Allergy   Verified 05/04/23 21:40














- Social History


Does the pt smoke?: Yes


Smoking Status: Current every day smoker


Does the pt drink ETOH?: No


Does the pt have substance abuse?: Yes





- Immunizations


Immunizations are current?: Yes





- POLST


Patient has POLST: No





PD ED PE NORMAL





- Vitals


Vital signs reviewed: Yes





- General


General: Alert and oriented X 3, No acute distress, Well developed/nourished





PD ED PE EXPANDED





- Extremities


Extremities: Other (BLE brawny and pitting edema with lower leg discoloration 

c/w venous stasis. there is noticeably greater swelling of RLE from mid-calf to 

toes with warmth to touch and confluent erythema from lower leg to distal thigh 

)





Results





- Vitals


Vitals: 


                                     Oxygen











O2 Source                      Room air

















- Rads (name of study)


  ** RLE US


Relevant Findings:: Prelim report reviewed, See rad report





PD Medical Decision Making





- ED course


Complexity details: considered differential, d/w patient


ED course: 





no evidence of DVT on RLE US. Symptoms/signs are likely due to cellulitis. He 

declines pain medication (both in ED and rx). Given doxycycline in ED and rx for

 same is provided. Diagnosis and prognosis discussed as were return precautions.

 





Departure





- Departure


Disposition: 01 Home, Self Care


Clinical Impression: 


Cellulitis


Qualifiers:


 Site of cellulitis: extremity Site of cellulitis of extremity: lower extremity 

Laterality: right Qualified Code(s): L03.115 - Cellulitis of right lower limb





Condition: Good


Instructions:  ED Infec Skin Cellulitis


Prescriptions: 


Doxycycline [Vibramycin] 100 mg PO BID #19 tablet


Comments: 


There is no evidence of a blood clot (DVT, deep vein thrombosis) on the 

ultrasound.  As we discussed, your symptoms and findings on the physical exam 

are highly suggestive of skin infection (cellulitis).  You are given a dose of 

an antibiotic (doxycycline) in the emergency department, as well as a 

prescription for a 10-day course of this antibiotic (electronically submitted to

 [facility] pharmacy in [place]).  It usually takes 1 to 2 days for the 

antibiotic to have a noticeable effect, during which time you might find the 

symptoms worsen before they improve.  If your symptoms continue to worsen 3 or 

more days into the antibiotic course, follow-up with your primary care provider.

  Certainly, if you feel you need to be reevaluated on an emergent basis, you 

can always return to the emergency department for reevaluation.


Discharge Date/Time: 05/05/23 00:20

## 2023-05-05 NOTE — ULTRASOUND REPORT
PROCEDURE:  Duplex Ext Veins Right

 

INDICATIONS:  atraumatic RLE pain, swelling

 

TECHNIQUE:  

Real-time imaging, as well as color and pulse Doppler interrogation, were performed of the lower extr
emity deep veins from the inguinal ligament to the popliteal fossa.  

 

COMPARISON:  None.

 

FINDINGS:  The deep veins are normally compressible, and free of intraluminal thrombus.  Color and pu
lse Doppler demonstrate normal phasic intraluminal flow.  There is normal augmentation response to di
stal compression maneuver.  

 

There are mildly enlarged right inguinal lymph nodes measuring up to 1.1 cm in short axis with a pres
erved fatty hilum. A lobulated intraluminal node is also noted measuring up to 2.4 cm in short axis w
ith an indistinct fatty hilum.

 

IMPRESSION:  

 

1. No evidence of deep venous thrombosis in the right lower extremity.

 

2. Mildly enlarged right inguinal lymph nodes are nonspecific and likely reactive but correlation is 
recommended clinically.

 

Reviewed by: Lv Alvares MD on 5/5/2023 12:15 AM PDT

Approved by: Lv Alvares MD on 5/5/2023 12:15 AM PDT

 

 

Station ID:  [id number]